# Patient Record
Sex: FEMALE | HISPANIC OR LATINO | URBAN - METROPOLITAN AREA
[De-identification: names, ages, dates, MRNs, and addresses within clinical notes are randomized per-mention and may not be internally consistent; named-entity substitution may affect disease eponyms.]

---

## 2018-06-27 ENCOUNTER — OFFICE VISIT (OUTPATIENT)
Dept: URBAN - METROPOLITAN AREA CLINIC 32 | Facility: CLINIC | Age: 83
End: 2018-06-27
Payer: COMMERCIAL

## 2018-06-27 DIAGNOSIS — H10.413 CONJUNCTIVITIS - ALLERGIC: ICD-10-CM

## 2018-06-27 DIAGNOSIS — H04.123 DRY EYE SYNDROME: Primary | ICD-10-CM

## 2018-06-27 PROCEDURE — 99213 OFFICE O/P EST LOW 20 MIN: CPT | Performed by: OPTOMETRIST

## 2018-06-27 RX ORDER — BEPOTASTINE BESILATE 15 MG/ML
1.5 % SOLUTION/ DROPS OPHTHALMIC
Qty: 5 | Refills: 3 | Status: ACTIVE
Start: 2018-06-27

## 2018-06-27 ASSESSMENT — INTRAOCULAR PRESSURE
OD: 9
OS: 10

## 2018-06-27 NOTE — IMPRESSION/PLAN
Impression: Dry Eye Syndrome: H97.234. Plan: Dry eyes account for the patient's complaints. There is no evidence of permanent changes to the cornea. Explained condition does not have a cure and will need artificial tears for maintenance.

## 2019-08-01 ENCOUNTER — OFFICE VISIT (OUTPATIENT)
Dept: URBAN - METROPOLITAN AREA CLINIC 32 | Facility: CLINIC | Age: 84
End: 2019-08-01
Payer: COMMERCIAL

## 2019-08-01 DIAGNOSIS — E11.9 TYPE 2 DIABETES MELLITUS WITHOUT COMPLICATIONS: Primary | ICD-10-CM

## 2019-08-01 PROCEDURE — 92134 CPTRZ OPH DX IMG PST SGM RTA: CPT | Performed by: OPTOMETRIST

## 2019-08-01 PROCEDURE — 92014 COMPRE OPH EXAM EST PT 1/>: CPT | Performed by: OPTOMETRIST

## 2019-08-01 RX ORDER — POTASSIUM CHLORIDE 750 MG/1
CAPSULE, EXTENDED RELEASE ORAL
Qty: 0 | Refills: 0 | Status: ACTIVE
Start: 2019-08-01

## 2019-08-01 ASSESSMENT — INTRAOCULAR PRESSURE
OS: 10
OD: 10

## 2019-08-01 NOTE — IMPRESSION/PLAN
Impression: Type 2 diabetes mellitus without complications: W30.3. Plan: Diabetes type II: no background retinopathy, no signs of neovascularization noted. Discussed ocular and systemic benefits of blood sugar control.  RTC 1 year DE OCT

## 2020-05-06 ENCOUNTER — OFFICE VISIT (OUTPATIENT)
Dept: URBAN - METROPOLITAN AREA CLINIC 32 | Facility: CLINIC | Age: 85
End: 2020-05-06
Payer: COMMERCIAL

## 2020-05-06 PROCEDURE — 99214 OFFICE O/P EST MOD 30 MIN: CPT | Performed by: OPTOMETRIST

## 2020-05-06 ASSESSMENT — INTRAOCULAR PRESSURE
OD: 10
OS: 10

## 2020-05-06 NOTE — IMPRESSION/PLAN
Impression: Type 2 diabetes mellitus without complications: J92.7. Plan: Diabetes type II: no background retinopathy, no signs of neovascularization noted. Discussed ocular and systemic benefits of blood sugar control.  RTC 1 year DE OCT poor view, refer for retina eval

## 2020-05-19 ENCOUNTER — OFFICE VISIT (OUTPATIENT)
Dept: URBAN - METROPOLITAN AREA CLINIC 32 | Facility: CLINIC | Age: 85
End: 2020-05-19
Payer: COMMERCIAL

## 2020-05-19 PROCEDURE — 67028 INJECTION EYE DRUG: CPT | Performed by: OPHTHALMOLOGY

## 2020-05-19 PROCEDURE — 92250 FUNDUS PHOTOGRAPHY W/I&R: CPT | Performed by: OPHTHALMOLOGY

## 2020-05-19 PROCEDURE — 92134 CPTRZ OPH DX IMG PST SGM RTA: CPT | Performed by: OPHTHALMOLOGY

## 2020-05-19 PROCEDURE — 92014 COMPRE OPH EXAM EST PT 1/>: CPT | Performed by: OPHTHALMOLOGY

## 2020-05-19 ASSESSMENT — INTRAOCULAR PRESSURE
OS: 13
OD: 13

## 2020-05-19 NOTE — IMPRESSION/PLAN
Impression: Exdtve age-rel mclr degn, right eye, with actv chrdl neovas: H35.3211. Plan: Peripheral net (inferior) OD with heme/HEs extending toward the center -- EKATERINA OD #1 today PEHCR Recheck 1mo OCT/EKATERINA OD #2
FP taken today

## 2020-06-22 ENCOUNTER — OFFICE VISIT (OUTPATIENT)
Dept: URBAN - METROPOLITAN AREA CLINIC 32 | Facility: CLINIC | Age: 85
End: 2020-06-22
Payer: COMMERCIAL

## 2020-06-22 DIAGNOSIS — H35.3211 EXDTVE AGE-REL MCLR DEGN, RIGHT EYE, WITH ACTV CHRDL NEOVAS: Primary | ICD-10-CM

## 2020-06-22 PROCEDURE — 67028 INJECTION EYE DRUG: CPT | Performed by: OPHTHALMOLOGY

## 2020-06-22 PROCEDURE — 92134 CPTRZ OPH DX IMG PST SGM RTA: CPT | Performed by: OPHTHALMOLOGY

## 2020-06-22 ASSESSMENT — INTRAOCULAR PRESSURE
OS: 14
OD: 14

## 2020-06-22 NOTE — IMPRESSION/PLAN
Impression: Exdtve age-rel mclr degn, right eye, with actv chrdl neovas: H35.3211. Plan: Peripheral net (inferior) OD with heme/HEs extending toward the center -- EKATERINA OD #2 today PEHCR Recheck 1mo OCT/EKATERINA OD #3 She has a lot of vitreous opacities - ?  VH that are likely causing drop in [address] [facility]

## 2020-08-03 ENCOUNTER — OFFICE VISIT (OUTPATIENT)
Dept: URBAN - METROPOLITAN AREA CLINIC 32 | Facility: CLINIC | Age: 85
End: 2020-08-03
Payer: COMMERCIAL

## 2020-08-03 PROCEDURE — 92014 COMPRE OPH EXAM EST PT 1/>: CPT | Performed by: OPHTHALMOLOGY

## 2020-08-03 PROCEDURE — 92134 CPTRZ OPH DX IMG PST SGM RTA: CPT | Performed by: OPHTHALMOLOGY

## 2020-08-03 ASSESSMENT — INTRAOCULAR PRESSURE
OS: 12
OD: 11

## 2020-08-03 NOTE — IMPRESSION/PLAN
Impression: Exdtve age-rel mclr degn, right eye, with actv chrdl neovas: H35.3211. Plan: Today VA and exam better (looks like area of [address] has sclerosed down) -- central retina looks good Peripheral net (inferior) OD with heme/HEs extending toward the center -- s/p EKATERINA OD #2 PEHCR Recheck 1mo OCT/exam

She has a lot of vitreous opacities - ?  VH that are likely causing drop in [place]

## 2020-09-14 ENCOUNTER — OFFICE VISIT (OUTPATIENT)
Dept: URBAN - METROPOLITAN AREA CLINIC 32 | Facility: CLINIC | Age: 85
End: 2020-09-14
Payer: COMMERCIAL

## 2020-09-14 PROCEDURE — 92134 CPTRZ OPH DX IMG PST SGM RTA: CPT | Performed by: OPHTHALMOLOGY

## 2020-09-14 PROCEDURE — 92014 COMPRE OPH EXAM EST PT 1/>: CPT | Performed by: OPHTHALMOLOGY

## 2020-09-14 ASSESSMENT — INTRAOCULAR PRESSURE
OD: 14
OS: 15

## 2020-09-14 NOTE — IMPRESSION/PLAN
Impression: Exdtve age-rel mclr degn, right eye, with actv chrdl neovas: H35.3211. Plan: Continued improvement in [address] [facility] and exam better (looks like area of [address] has sclerosed down) -- central retina looks good Peripheral net (inferior) OD with heme/HEs extending toward the center -- s/p EKATERINA OD #2 PEHCR Recheck 3mo OCT/exam, sooner prn She has a lot of vitreous opacities - ?  VH that are likely causing drop in [address] [facility]

## 2020-12-07 ENCOUNTER — OFFICE VISIT (OUTPATIENT)
Dept: URBAN - METROPOLITAN AREA CLINIC 32 | Facility: CLINIC | Age: 85
End: 2020-12-07
Payer: COMMERCIAL

## 2020-12-07 PROCEDURE — 92014 COMPRE OPH EXAM EST PT 1/>: CPT | Performed by: OPHTHALMOLOGY

## 2020-12-07 ASSESSMENT — INTRAOCULAR PRESSURE
OS: 10
OD: 12

## 2020-12-07 NOTE — IMPRESSION/PLAN
Impression: Exdtve age-rel mclr degn, right eye, with actv chrdl neovas: H35.3211. Plan: Exam/VA stable (area of [facility] has sclerosed down) -- central retina looks good Peripheral net (inferior) OD with heme/HEs extending toward the center -- s/p EKATERINA OD #2 PEHCR Recheck 6mo OCT/exam, sooner prn

## 2021-06-21 ENCOUNTER — OFFICE VISIT (OUTPATIENT)
Dept: URBAN - METROPOLITAN AREA CLINIC 32 | Facility: CLINIC | Age: 86
End: 2021-06-21
Payer: COMMERCIAL

## 2021-06-21 PROCEDURE — 92014 COMPRE OPH EXAM EST PT 1/>: CPT | Performed by: OPHTHALMOLOGY

## 2021-06-21 PROCEDURE — 92134 CPTRZ OPH DX IMG PST SGM RTA: CPT | Performed by: OPHTHALMOLOGY

## 2021-06-21 ASSESSMENT — INTRAOCULAR PRESSURE
OS: 7
OD: 8

## 2021-06-22 NOTE — IMPRESSION/PLAN
Impression: Exdtve age-rel mclr degn, right eye, with actv chrdl neovas: H35.3211. Plan: Doing well, no new findings on exam

Exam/VA stable (area of [facility]/[place] has sclerosed down) -- central retina looks good Peripheral net (inferior) OD with heme/HEs extending toward the center -- s/p EKATERINA OD #2 PEHCR Recheck 6mo OCT/exam, sooner prn

## 2021-12-13 ENCOUNTER — OFFICE VISIT (OUTPATIENT)
Dept: URBAN - METROPOLITAN AREA CLINIC 32 | Facility: CLINIC | Age: 86
End: 2021-12-13
Payer: COMMERCIAL

## 2021-12-13 PROCEDURE — 92134 CPTRZ OPH DX IMG PST SGM RTA: CPT | Performed by: OPHTHALMOLOGY

## 2021-12-13 PROCEDURE — 99213 OFFICE O/P EST LOW 20 MIN: CPT | Performed by: OPHTHALMOLOGY

## 2021-12-13 ASSESSMENT — INTRAOCULAR PRESSURE
OS: 10
OD: 8

## 2022-07-11 ENCOUNTER — OFFICE VISIT (OUTPATIENT)
Dept: URBAN - METROPOLITAN AREA CLINIC 32 | Facility: CLINIC | Age: 87
End: 2022-07-11
Payer: COMMERCIAL

## 2022-07-11 DIAGNOSIS — H35.3212 EXDTVE AGE-REL MCLR DEGN, RIGHT EYE, WITH INACT CHRDL NEOVAS: Primary | ICD-10-CM

## 2022-07-11 PROCEDURE — 99213 OFFICE O/P EST LOW 20 MIN: CPT | Performed by: OPHTHALMOLOGY

## 2022-07-11 PROCEDURE — 92134 CPTRZ OPH DX IMG PST SGM RTA: CPT | Performed by: OPHTHALMOLOGY

## 2022-07-11 ASSESSMENT — INTRAOCULAR PRESSURE
OD: 14
OS: 14

## 2022-07-11 NOTE — IMPRESSION/PLAN
Impression: Exdtve age-rel mclr degn, right eye, with inact chrdl neovas: H35.3212. Right. Condition: stable. Vision: vision stable. s/p AV OD 5/19/2020, 6/22/2020 w/Dr Ghassan Yoo Plan: Discussed diagnosis in detail with patient. No treatment is required at this time based on exam and diagnostic test. Recommend observation for now. Will reassess condition in 8 mos, sooner if there is a change or decrease in vision. OCT is stable OD.

## 2023-04-10 ENCOUNTER — OFFICE VISIT (OUTPATIENT)
Dept: URBAN - METROPOLITAN AREA CLINIC 32 | Facility: CLINIC | Age: 88
End: 2023-04-10
Payer: COMMERCIAL

## 2023-04-10 DIAGNOSIS — H35.3212 EXDTVE AGE-REL MCLR DEGN, RIGHT EYE, WITH INACT CHRDL NEOVAS: Primary | ICD-10-CM

## 2023-04-10 DIAGNOSIS — H43.813 VITREOUS DEGENERATION, BILATERAL: ICD-10-CM

## 2023-04-10 PROCEDURE — 92134 CPTRZ OPH DX IMG PST SGM RTA: CPT | Performed by: OPHTHALMOLOGY

## 2023-04-10 PROCEDURE — 92014 COMPRE OPH EXAM EST PT 1/>: CPT | Performed by: OPHTHALMOLOGY

## 2023-04-10 ASSESSMENT — INTRAOCULAR PRESSURE
OD: 11
OS: 12

## 2023-04-10 NOTE — IMPRESSION/PLAN
Impression: Exdtve age-rel mclr degn, right eye, with inact chrdl neovas: H35.3212 Right. Plan: Stable VA, no fluid on OCT - observe LIVA 6/20 OD Discussed condition/plan with patient including use of AREDS2/[place]. No treatment needed today. Told to call immediately with changes. Patient understands/agrees with plan. OCT ordered today.  
 6m OCT/exam

## 2023-04-10 NOTE — IMPRESSION/PLAN
Impression: Vitreous degeneration, bilateral: H43.813. Plan: Chronic PVD OU - no breaks seen Discussed condition/plan with patient including RD return precautions. No treatment needed today. Patient understands/agrees with plan. OCT ordered today. 
 6m OCT/exam

## 2023-10-09 ENCOUNTER — OFFICE VISIT (OUTPATIENT)
Dept: URBAN - METROPOLITAN AREA CLINIC 32 | Facility: CLINIC | Age: 88
End: 2023-10-09
Payer: COMMERCIAL

## 2023-10-09 DIAGNOSIS — H43.813 VITREOUS DEGENERATION, BILATERAL: ICD-10-CM

## 2023-10-09 DIAGNOSIS — H35.3212 EXDTVE AGE-REL MCLR DEGN, RIGHT EYE, WITH INACT CHRDL NEOVAS: Primary | ICD-10-CM

## 2023-10-09 PROCEDURE — 92134 CPTRZ OPH DX IMG PST SGM RTA: CPT | Performed by: OPHTHALMOLOGY

## 2023-10-09 PROCEDURE — 92014 COMPRE OPH EXAM EST PT 1/>: CPT | Performed by: OPHTHALMOLOGY

## 2023-10-09 ASSESSMENT — INTRAOCULAR PRESSURE
OS: 9
OD: 10

## 2024-06-03 ENCOUNTER — OFFICE VISIT (OUTPATIENT)
Dept: URBAN - METROPOLITAN AREA CLINIC 32 | Facility: CLINIC | Age: 89
End: 2024-06-03
Payer: COMMERCIAL

## 2024-06-03 DIAGNOSIS — H43.813 VITREOUS DEGENERATION, BILATERAL: ICD-10-CM

## 2024-06-03 DIAGNOSIS — H35.3212 EXUDATIVE AGE-RELATED MACULAR DEGENERATION, RIGHT EYE, WITH INACTIVE CHOROIDAL NEOVASCULARIZATION: Primary | ICD-10-CM

## 2024-06-03 PROCEDURE — 92134 CPTRZ OPH DX IMG PST SGM RTA: CPT | Performed by: OPHTHALMOLOGY

## 2024-06-03 PROCEDURE — 92014 COMPRE OPH EXAM EST PT 1/>: CPT | Performed by: OPHTHALMOLOGY

## 2024-06-03 ASSESSMENT — INTRAOCULAR PRESSURE
OS: 13
OD: 13